# Patient Record
Sex: FEMALE | Race: WHITE | NOT HISPANIC OR LATINO | Employment: STUDENT | URBAN - METROPOLITAN AREA
[De-identification: names, ages, dates, MRNs, and addresses within clinical notes are randomized per-mention and may not be internally consistent; named-entity substitution may affect disease eponyms.]

---
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
## 2025-03-28 ENCOUNTER — APPOINTMENT (OUTPATIENT)
Dept: URBAN - METROPOLITAN AREA CLINIC 20 | Facility: CLINIC | Age: 20
Setting detail: DERMATOLOGY
End: 2025-03-28

## 2025-03-28 DIAGNOSIS — Z41.9 ENCOUNTER FOR PROCEDURE FOR PURPOSES OTHER THAN REMEDYING HEALTH STATE, UNSPECIFIED: ICD-10-CM

## 2025-03-28 PROCEDURE — ? COSMETIC CONSULTATION - PULSED-DYE LASER

## 2025-03-28 ASSESSMENT — LOCATION DETAILED DESCRIPTION DERM
LOCATION DETAILED: LEFT ANTERIOR PROXIMAL THIGH
LOCATION DETAILED: RIGHT ANTERIOR PROXIMAL THIGH

## 2025-03-28 ASSESSMENT — LOCATION SIMPLE DESCRIPTION DERM
LOCATION SIMPLE: RIGHT THIGH
LOCATION SIMPLE: LEFT THIGH

## 2025-03-28 ASSESSMENT — LOCATION ZONE DERM: LOCATION ZONE: LEG

## 2025-03-28 NOTE — PROCEDURE: COSMETIC CONSULTATION - PULSED-DYE LASER
Consultation Charge $ (Use Numbers Only, No Text Please.): 79.79
Detail Level: Detailed
Send Procedure Quote As Charge: No

## 2025-04-02 ENCOUNTER — APPOINTMENT (OUTPATIENT)
Dept: URBAN - METROPOLITAN AREA CLINIC 20 | Facility: CLINIC | Age: 20
Setting detail: DERMATOLOGY
End: 2025-04-02

## 2025-04-02 DIAGNOSIS — Z41.9 ENCOUNTER FOR PROCEDURE FOR PURPOSES OTHER THAN REMEDYING HEALTH STATE, UNSPECIFIED: ICD-10-CM

## 2025-04-02 PROCEDURE — ? PULSED-DYE LASER

## 2025-04-02 ASSESSMENT — LOCATION SIMPLE DESCRIPTION DERM
LOCATION SIMPLE: LEFT THIGH
LOCATION SIMPLE: RIGHT THIGH

## 2025-04-02 ASSESSMENT — LOCATION ZONE DERM: LOCATION ZONE: LEG

## 2025-04-02 ASSESSMENT — LOCATION DETAILED DESCRIPTION DERM
LOCATION DETAILED: LEFT ANTERIOR PROXIMAL THIGH
LOCATION DETAILED: RIGHT ANTERIOR PROXIMAL THIGH

## 2025-04-02 NOTE — PROCEDURE: PULSED-DYE LASER
Fluence In J/Cm2 (Optional): 13.0
Location (Required For Details To Render In Note But Body Touch Will Also Count For First Location): anterior thighs
Pulse Duration: 40 ms
Treated Area: small area
Cryogen Time (Ms): 30
Consent: Written consent obtained, risks reviewed including but not limited to crusting, scabbing, blistering, scarring, darker or lighter pigmentary change, incidental hair removal, bruising, and/or incomplete removal.
Immediate Endpoint: purpura
Detail Level: Simple
Delay Time (Ms): 20
Spot Size: 7 mm
Pulse Duration: 20 ms
Post-Procedure Care: Sunscreen
Post-Care Instructions: I reviewed with the patient in detail post-care instructions. Patient should stay away from the sun and wear sun protection until treated areas are fully healed.
Delay Time (Ms): 10
Spot Size: 10 mm
Pulse Duration: 10 ms
Fluence In J/Cm2 (Optional): 4.00
Laser Type: Vbeam 595nm
Price (Use Numbers Only, No Special Characters Or $): 027
Spot Size: 10x3 mm

## 2025-04-27 ENCOUNTER — APPOINTMENT (OUTPATIENT)
Dept: URGENT CARE | Facility: CLINIC | Age: 20
End: 2025-04-27
Payer: COMMERCIAL

## 2025-04-27 ENCOUNTER — OFFICE VISIT (OUTPATIENT)
Dept: URGENT CARE | Facility: CLINIC | Age: 20
End: 2025-04-27
Payer: COMMERCIAL

## 2025-04-27 ENCOUNTER — RESULTS FOLLOW-UP (OUTPATIENT)
Dept: URGENT CARE | Facility: CLINIC | Age: 20
End: 2025-04-27

## 2025-04-27 VITALS
OXYGEN SATURATION: 97 % | TEMPERATURE: 99.1 F | WEIGHT: 192 LBS | HEART RATE: 127 BPM | RESPIRATION RATE: 16 BRPM | HEIGHT: 66 IN | BODY MASS INDEX: 30.86 KG/M2 | DIASTOLIC BLOOD PRESSURE: 72 MMHG | SYSTOLIC BLOOD PRESSURE: 96 MMHG

## 2025-04-27 DIAGNOSIS — J02.9 PHARYNGITIS, UNSPECIFIED ETIOLOGY: ICD-10-CM

## 2025-04-27 LAB
FLUAV RNA SPEC QL NAA+PROBE: NEGATIVE
FLUBV RNA SPEC QL NAA+PROBE: NEGATIVE
RSV RNA SPEC QL NAA+PROBE: NEGATIVE
S PYO DNA SPEC NAA+PROBE: NOT DETECTED
SARS-COV-2 RNA RESP QL NAA+PROBE: NEGATIVE

## 2025-04-27 RX ORDER — CEFPROZIL 250 MG/1
TABLET, FILM COATED ORAL
COMMUNITY

## 2025-04-27 RX ORDER — CHLORHEXIDINE GLUCONATE ORAL RINSE 1.2 MG/ML
SOLUTION DENTAL
COMMUNITY

## 2025-04-27 RX ORDER — CIPROFLOXACIN AND DEXAMETHASONE 3; 1 MG/ML; MG/ML
SUSPENSION/ DROPS AURICULAR (OTIC)
COMMUNITY

## 2025-04-27 RX ORDER — PHENAZOPYRIDINE HYDROCHLORIDE 200 MG/1
TABLET, FILM COATED ORAL
COMMUNITY

## 2025-04-27 RX ORDER — DEXAMETHASONE SODIUM PHOSPHATE 10 MG/ML
10 INJECTION, SOLUTION INTRA-ARTICULAR; INTRALESIONAL; INTRAMUSCULAR; INTRAVENOUS; SOFT TISSUE ONCE
Status: COMPLETED | OUTPATIENT
Start: 2025-04-27 | End: 2025-04-27

## 2025-04-27 RX ORDER — AMOXICILLIN 500 MG/1
CAPSULE ORAL
COMMUNITY

## 2025-04-27 RX ORDER — CEFDINIR 300 MG/1
CAPSULE ORAL
COMMUNITY

## 2025-04-27 RX ORDER — AZITHROMYCIN 250 MG/1
TABLET, FILM COATED ORAL
COMMUNITY

## 2025-04-27 RX ADMIN — DEXAMETHASONE SODIUM PHOSPHATE 10 MG: 10 INJECTION, SOLUTION INTRA-ARTICULAR; INTRALESIONAL; INTRAMUSCULAR; INTRAVENOUS; SOFT TISSUE at 14:22

## 2025-04-27 ASSESSMENT — ENCOUNTER SYMPTOMS
SORE THROAT: 1
COUGH: 1

## 2025-04-27 NOTE — PROGRESS NOTES
"Subjective:   Antonette Kamara is a 19 y.o. female who presents for Cough (Cough, nasal drainage, congestion x 1 week /Sore throat x 2 days)      HPI:  19-year-old female presents with cough for the past week though that may have been allergies and recent sore throat fever and chills for the past 2 days.  She has had the symptoms started yesterday morning with a sore throat, pain with swallowing, fever chills and bodyaches.  - She does have a history of allergies and seasonal allergies.  She was housesitting at a house with cats that was triggering some of her allergy symptoms.  She had a cough that started around then so she is unsure if the cough was from her allergies versus this illness.  - No GI symptoms  - No shortness of breath  - Generalized fever chills body aches.    Review of Systems   HENT:  Positive for congestion and sore throat.    Respiratory:  Positive for cough.        Medications:    amoxicillin Caps  amoxicillin-clavulanate Tabs  azithromycin Tabs  cefdinir Caps  cefprozil Tabs  chlorhexidine Soln  ciprofloxacin/dexamethasone Susp  diclofenac sodium Gel  Lo-Zumandimine Tabs  phenazopyridine Tabs    Allergies: Omnicef [cefdinir]    Problem List: Antonette Kamara does not have a problem list on file.    Surgical History:  No past surgical history on file.    Past Social Hx: Antonette Kamara  reports that she has never smoked. She has never used smokeless tobacco. She reports that she does not currently use alcohol. She reports that she does not currently use drugs.     Past Family Hx:  Antonette Kamara family history is not on file.     Problem list, medications, and allergies reviewed by myself today in Epic.     Objective:     BP 96/72 (BP Location: Left arm, Patient Position: Sitting, BP Cuff Size: Adult)   Pulse (!) 127   Temp 37.3 °C (99.1 °F) (Temporal)   Resp 16   Ht 1.676 m (5' 6\")   Wt 87.1 kg (192 lb)   SpO2 97%   BMI 30.99 kg/m²     Physical Exam  Constitutional:       " Appearance: Normal appearance.   HENT:      Head: Normocephalic and atraumatic.      Right Ear: Tympanic membrane normal.      Left Ear: Tympanic membrane normal.      Nose: Congestion present. No rhinorrhea.      Mouth/Throat:      Mouth: Mucous membranes are moist.      Pharynx: Oropharyngeal exudate and posterior oropharyngeal erythema present.   Eyes:      Extraocular Movements: Extraocular movements intact.      Conjunctiva/sclera: Conjunctivae normal.   Cardiovascular:      Rate and Rhythm: Normal rate and regular rhythm.      Pulses: Normal pulses.      Heart sounds: Normal heart sounds.   Pulmonary:      Effort: Pulmonary effort is normal.      Breath sounds: Normal breath sounds.   Neurological:      Mental Status: She is alert.         Assessment/Plan:     Diagnosis and associated orders:     1. Pharyngitis, unspecified etiology  POCT CEPHEID GROUP A STREP - PCR    POCT CoV-2, Flu A/B, RSV by PCR    dexamethasone (Decadron) injection (check route below) 10 mg         Comments/MDM:     1. Pharyngitis, unspecified etiology  Supportive care to manage pharyngitis discussed with patient  Salt water gargles BID and prn. Suggested 1/4 to 1/2 teaspoon (1.5 to 3.0 g) of salt per one cup (8 ounces or 250 mL) of warm water.   Use of honey and warm water and/or tea helps alleviate feelings of discomfort and pain as well.  OTC throat analgesic spray or lozenge of choice prn throat pain. Dosage and directions per   OTC  analgesic of choice (acetaminophen or NSAID) prn pain. Follow manufactures dosing and safety precautions.     Return precautions discussed including significant worsening of current symptoms no improvement with supportive care or treatment, difficulty breathing or difficulty swallowing.    - POCT CEPHEID GROUP A STREP - PCR  - POCT CoV-2, Flu A/B, RSV by PCR  - dexamethasone (Decadron) injection (check route below) 10 mg           Differential diagnosis, natural history, supportive care, and  indications for immediate follow-up discussed.    Advised the patient to follow-up with the primary care physician for recheck, reevaluation, and consideration of further management.    Please note that this dictation was created using voice recognition software. I have made a reasonable attempt to correct obvious errors, but I expect that there are errors of grammar and possibly content that I did not discover before finalizing the note.    Ronnie Brady M.D.

## 2025-04-29 ENCOUNTER — APPOINTMENT (OUTPATIENT)
Dept: URGENT CARE | Facility: CLINIC | Age: 20
End: 2025-04-29
Payer: COMMERCIAL

## 2025-04-30 ENCOUNTER — APPOINTMENT (OUTPATIENT)
Dept: URBAN - METROPOLITAN AREA CLINIC 20 | Facility: CLINIC | Age: 20
Setting detail: DERMATOLOGY
End: 2025-04-30

## 2025-04-30 DIAGNOSIS — Z41.9 ENCOUNTER FOR PROCEDURE FOR PURPOSES OTHER THAN REMEDYING HEALTH STATE, UNSPECIFIED: ICD-10-CM

## 2025-04-30 PROCEDURE — ? PULSED-DYE LASER

## 2025-04-30 ASSESSMENT — LOCATION DETAILED DESCRIPTION DERM
LOCATION DETAILED: RIGHT ANTERIOR PROXIMAL THIGH
LOCATION DETAILED: LEFT ANTERIOR PROXIMAL THIGH

## 2025-04-30 ASSESSMENT — LOCATION SIMPLE DESCRIPTION DERM
LOCATION SIMPLE: RIGHT THIGH
LOCATION SIMPLE: LEFT THIGH

## 2025-04-30 ASSESSMENT — LOCATION ZONE DERM: LOCATION ZONE: LEG

## 2025-04-30 NOTE — PROCEDURE: PULSED-DYE LASER
Fluence In J/Cm2 (Optional): 13.0
Location (Required For Details To Render In Note But Body Touch Will Also Count For First Location): anterior thighs
Pulse Duration: .45 ms
Treated Area: small area
Cryogen Time (Ms): 30
Consent: Written consent obtained, risks reviewed including but not limited to crusting, scabbing, blistering, scarring, darker or lighter pigmentary change, incidental hair removal, bruising, and/or incomplete removal.
Immediate Endpoint: purpura
Detail Level: Simple
Delay Time (Ms): 20
Spot Size: 7 mm
Pulse Duration: 20 ms
Post-Procedure Care: Sunscreen
Post-Care Instructions: I reviewed with the patient in detail post-care instructions. Patient should stay away from the sun and wear sun protection until treated areas are fully healed.
Delay Time (Ms): 10
Spot Size: 10 mm
Pulse Duration: 10 ms
Fluence In J/Cm2 (Optional): 4.50
Laser Type: Vbeam 595nm
Price (Use Numbers Only, No Special Characters Or $): 011
Spot Size: 10x3 mm

## 2025-06-05 ENCOUNTER — APPOINTMENT (OUTPATIENT)
Dept: URBAN - METROPOLITAN AREA CLINIC 20 | Facility: CLINIC | Age: 20
Setting detail: DERMATOLOGY
End: 2025-06-05

## 2025-07-11 ENCOUNTER — APPOINTMENT (OUTPATIENT)
Dept: URBAN - METROPOLITAN AREA CLINIC 20 | Facility: CLINIC | Age: 20
Setting detail: DERMATOLOGY
End: 2025-07-11

## 2025-07-11 DIAGNOSIS — Z41.9 ENCOUNTER FOR PROCEDURE FOR PURPOSES OTHER THAN REMEDYING HEALTH STATE, UNSPECIFIED: ICD-10-CM

## 2025-07-11 PROCEDURE — ? PULSED-DYE LASER

## 2025-07-11 ASSESSMENT — LOCATION ZONE DERM: LOCATION ZONE: LEG

## 2025-07-11 ASSESSMENT — LOCATION DETAILED DESCRIPTION DERM
LOCATION DETAILED: RIGHT ANTERIOR PROXIMAL THIGH
LOCATION DETAILED: LEFT ANTERIOR PROXIMAL THIGH

## 2025-07-11 ASSESSMENT — LOCATION SIMPLE DESCRIPTION DERM
LOCATION SIMPLE: RIGHT THIGH
LOCATION SIMPLE: LEFT THIGH

## 2025-07-11 NOTE — PROCEDURE: PULSED-DYE LASER
Fluence In J/Cm2 (Optional): 13.0
Location (Required For Details To Render In Note But Body Touch Will Also Count For First Location): anterior thighs
Pulse Duration: 1.5 ms
Treated Area: small area
Cryogen Time (Ms): 30
Consent: Written consent obtained, risks reviewed including but not limited to crusting, scabbing, blistering, scarring, darker or lighter pigmentary change, incidental hair removal, bruising, and/or incomplete removal.
Immediate Endpoint: purpura
Detail Level: Simple
Delay Time (Ms): 20
Spot Size: 7 mm
Pulse Duration: 20 ms
Post-Procedure Care: Sunscreen
Post-Care Instructions: I reviewed with the patient in detail post-care instructions. Patient should stay away from the sun and wear sun protection until treated areas are fully healed.
Pulse Duration: 10 ms
Fluence In J/Cm2 (Optional): 7.75
Laser Type: Vbeam 595nm
Price (Use Numbers Only, No Special Characters Or $): 295
Spot Size: 10x3 mm

## 2025-08-08 RX ORDER — CLASCOTERONE 1 G/100G
CREAM TOPICAL
Qty: 60 | Refills: 4 | Status: ERX

## 2025-08-26 ENCOUNTER — APPOINTMENT (OUTPATIENT)
Dept: URBAN - METROPOLITAN AREA CLINIC 20 | Facility: CLINIC | Age: 20
Setting detail: DERMATOLOGY
End: 2025-08-26

## 2025-08-26 DIAGNOSIS — Z41.9 ENCOUNTER FOR PROCEDURE FOR PURPOSES OTHER THAN REMEDYING HEALTH STATE, UNSPECIFIED: ICD-10-CM

## 2025-08-26 PROCEDURE — ? ADDITIONAL NOTES

## 2025-08-26 ASSESSMENT — LOCATION SIMPLE DESCRIPTION DERM
LOCATION SIMPLE: RIGHT THIGH
LOCATION SIMPLE: LEFT THIGH

## 2025-08-26 ASSESSMENT — LOCATION ZONE DERM: LOCATION ZONE: LEG

## 2025-08-26 ASSESSMENT — LOCATION DETAILED DESCRIPTION DERM
LOCATION DETAILED: RIGHT ANTERIOR PROXIMAL THIGH
LOCATION DETAILED: LEFT ANTERIOR PROXIMAL THIGH